# Patient Record
(demographics unavailable — no encounter records)

---

## 2025-07-17 NOTE — HISTORY OF PRESENT ILLNESS
[FreeTextEntry1] : JAMI GONZALEZ 35YO,  Presents for Annual check up  Patient is here for an annual checkup. No complains PMHX: hypothyroidism, and minor gastritis PSHX: Denies OBHX: Denies GYNHX: Denies LMP: 2025 regular menses  Menarche at 12 years old  Not sexually active  Medications: levothyroxine  Last Pap in 2024  Never had a Mammo  Family history of breast cancer: Mother (remission), and maternal grandmother (remission)